# Patient Record
Sex: FEMALE | Race: BLACK OR AFRICAN AMERICAN | NOT HISPANIC OR LATINO | URBAN - METROPOLITAN AREA
[De-identification: names, ages, dates, MRNs, and addresses within clinical notes are randomized per-mention and may not be internally consistent; named-entity substitution may affect disease eponyms.]

---

## 2018-06-08 ENCOUNTER — APPOINTMENT (RX ONLY)
Dept: URBAN - METROPOLITAN AREA CLINIC 125 | Facility: CLINIC | Age: 33
Setting detail: DERMATOLOGY
End: 2018-06-08

## 2018-06-08 DIAGNOSIS — Z01.818 ENCOUNTER FOR OTHER PREPROCEDURAL EXAMINATION: ICD-10-CM

## 2018-06-08 PROCEDURE — ? COUNSELING

## 2019-02-12 ENCOUNTER — IMPORTED ENCOUNTER (OUTPATIENT)
Dept: URBAN - METROPOLITAN AREA CLINIC 43 | Facility: CLINIC | Age: 34
End: 2019-02-12

## 2019-02-12 PROBLEM — G43.109: Noted: 2019-02-12

## 2019-02-12 PROBLEM — H52.223: Noted: 2019-02-12

## 2019-06-13 ENCOUNTER — IMPORTED ENCOUNTER (OUTPATIENT)
Dept: URBAN - METROPOLITAN AREA CLINIC 43 | Facility: CLINIC | Age: 34
End: 2019-06-13

## 2019-06-13 PROBLEM — G43.109: Noted: 2019-06-13

## 2019-06-13 PROBLEM — H52.223: Noted: 2019-06-13

## 2019-06-13 PROBLEM — H16.223: Noted: 2019-06-13

## 2019-06-13 PROBLEM — H40.013: Noted: 2019-06-13

## 2019-06-27 ENCOUNTER — APPOINTMENT (RX ONLY)
Dept: URBAN - METROPOLITAN AREA CLINIC 126 | Facility: CLINIC | Age: 34
Setting detail: DERMATOLOGY
End: 2019-06-27

## 2019-06-27 DIAGNOSIS — Z41.9 ENCOUNTER FOR PROCEDURE FOR PURPOSES OTHER THAN REMEDYING HEALTH STATE, UNSPECIFIED: ICD-10-CM

## 2019-06-27 PROCEDURE — ? COOLSCULPTING

## 2019-06-27 NOTE — PROCEDURE: COOLSCULPTING
Applicator Size: standard applicator
Applicator Size: CoolAdvantage Core
Applicator Size: CoolCore applicator
Location 1: upper back fat pad (left)
Suction Settings: The suction settings were per protocol.
Applicator Size: CoolCurve applicator
Time (Minutes - Will Only Render If Nonzero): [address]
Time (Minutes - Will Only Render If Nonzero): 0
Location 2: upper back fat pad (right)
Post Treatment: After treatment, the suction was turned off, and the applicator was removed from the skin.
Detail Level: Zone
Time (Minutes - Will Only Render If Nonzero): 35
Device: Coolsculpting
Intro: Prior to treatment, the area was cleaned with alcohol and marked out with a marking pen. The gel sheet was then applied uniformly. The applicator was applied to the skin with good contact and suction.
Consent: Written consent obtained, risks reviewed including, but not limited to, blistering from suction, darker or lighter pigmentary change, bruising, and/or need for multiple treatments.

## 2019-07-01 ENCOUNTER — APPOINTMENT (RX ONLY)
Dept: URBAN - METROPOLITAN AREA CLINIC 126 | Facility: CLINIC | Age: 34
Setting detail: DERMATOLOGY
End: 2019-07-01

## 2019-10-09 ENCOUNTER — RX ONLY (OUTPATIENT)
Age: 34
Setting detail: RX ONLY
End: 2019-10-09

## 2019-10-09 RX ORDER — AZITHROMYCIN DIHYDRATE 250 MG/1
TABLET, FILM COATED ORAL
Qty: 1 | Refills: 0 | Status: ERX

## 2020-04-19 ASSESSMENT — VISUAL ACUITY
OS_SC: J1+@5"
OD_PH: 20/40+1
OD_CC: 20/40
OD_SC: 20/50-1
OS_CC: 20/40
OS_CC: 20/30-1
OS_SC: 20/70-1
OS_PH: 20/40+1
OD_CC: 20/30-1

## 2020-04-19 ASSESSMENT — TONOMETRY
OD_IOP_MMHG: 14.0
OS_IOP_MMHG: 13.0
OD_IOP_MMHG: 12.0
OS_IOP_MMHG: 13.0

## 2020-04-19 ASSESSMENT — KERATOMETRY
OS_K2POWER_DIOPTERS: 42
OD_K1POWER_DIOPTERS: 4.5
OD_K2POWER_DIOPTERS: 41.75
OD_AXISANGLE2_DEGREES: 70
OS_AXISANGLE_DEGREES: 169
OD_AXISANGLE_DEGREES: 160
OS_AXISANGLE_DEGREES: 165
OS_K2POWER_DIOPTERS: 42
OD_AXISANGLE2_DEGREES: 74
OS_K1POWER_DIOPTERS: 44.25
OD_AXISANGLE_DEGREES: 154
OS_AXISANGLE2_DEGREES: 79
OS_AXISANGLE2_DEGREES: 75
OS_K1POWER_DIOPTERS: 44.5
OD_K2POWER_DIOPTERS: 42
OD_K1POWER_DIOPTERS: 44.5

## 2020-12-22 ENCOUNTER — RX ONLY (OUTPATIENT)
Age: 35
Setting detail: RX ONLY
End: 2020-12-22

## 2020-12-22 RX ORDER — PHARMACY COMPOUNDING ACCESSORY
1 EACH MISCELLANEOUS QHS
Qty: 30 | Refills: 2 | Status: ERX

## 2021-08-10 ENCOUNTER — RX ONLY (OUTPATIENT)
Age: 36
Setting detail: RX ONLY
End: 2021-08-10

## 2021-08-10 RX ORDER — PHARMACY COMPOUNDING ACCESSORY
1 EACH MISCELLANEOUS QHS
Qty: 45 | Refills: 3 | Status: ERX

## 2021-08-26 ENCOUNTER — RX ONLY (OUTPATIENT)
Age: 36
Setting detail: RX ONLY
End: 2021-08-26

## 2021-08-26 RX ORDER — PHARMACY COMPOUNDING ACCESSORY
1 EACH MISCELLANEOUS QHS
Qty: 45 | Refills: 3 | COMMUNITY
Start: 2021-08-26